# Patient Record
Sex: FEMALE | Race: WHITE | Employment: FULL TIME
[De-identification: names, ages, dates, MRNs, and addresses within clinical notes are randomized per-mention and may not be internally consistent; named-entity substitution may affect disease eponyms.]

---

## 2023-07-25 ENCOUNTER — HOSPITAL ENCOUNTER (OUTPATIENT)
Facility: HOSPITAL | Age: 58
Setting detail: RECURRING SERIES
Discharge: HOME | End: 2023-07-28
Payer: OTHER GOVERNMENT

## 2023-07-25 PROCEDURE — 97112 NEUROMUSCULAR REEDUCATION: CPT

## 2023-07-25 PROCEDURE — 97530 THERAPEUTIC ACTIVITIES: CPT

## 2023-07-25 PROCEDURE — 97110 THERAPEUTIC EXERCISES: CPT

## 2023-07-25 NOTE — PROGRESS NOTES
Total Total     TOTAL TREATMENT TIME:        40     [x]  Patient Education billed concurrently with other procedures   [x] Review HEP    [x] Progressed/Changed HEP, detail:  Access Code: [email or web address]  URL: [email or web address]  Date: 07/25/2023  Prepared by: Isela    Exercises  - Supine Posterior Pelvic Tilt  - 1 x daily - 7 x weekly - 2 sets - 10 reps - 5 secs hold  - Supine Bridge  - 1 x daily - 7 x weekly - 2 sets - 10 reps  - Supine Lower Trunk Rotation  - 1 x daily - 7 x weekly - 1 sets - 10 reps - 10 secs hold  - Supine Piriformis Stretch with Foot on Ground  - 1 x daily - 7 x weekly - 1 sets - 2 reps - 30 secs hold  - Sidelying Hip Abduction  - 1 x daily - 7 x weekly - 2 sets - 10 reps  [] Other detail:       Objective Information/Functional Measures/Assessment    Patient refused to try the Nustep or recumbent bike today, stating she has ongoing issues with her right knee that she states needs a knee replacement. Initiated exercises per POC today, which included stretches, core stabilization, and proximal LE strengthening. Albeit, patient was with limited willingness to try several strengthening exercises, but after patient education and encouragement by DPT patient did perform some LE strengthening exercises. She was noted to have weakness of bilateral LE's during flexion SLR's and hip abduction SLR's. HEP was given and reviewed with patient today. Of note, patient stated she is with financial limitations, so she'd like to only come once/week. Patient is also with limited availability for skilled PT with scheduling based on her personal/work schedule.     Patient will continue to benefit from skilled PT / OT services to modify and progress therapeutic interventions, analyze and address functional mobility deficits, analyze and address ROM deficits, analyze and address strength deficits, analyze and address soft tissue restrictions, analyze and cue for proper movement patterns, analyze and None

## 2023-11-07 ENCOUNTER — HOSPITAL ENCOUNTER (OUTPATIENT)
Facility: HOSPITAL | Age: 58
Setting detail: RECURRING SERIES
Discharge: HOME | End: 2023-11-10
Payer: OTHER GOVERNMENT

## 2023-11-07 PROCEDURE — 97535 SELF CARE MNGMENT TRAINING: CPT

## 2023-11-07 PROCEDURE — 97110 THERAPEUTIC EXERCISES: CPT

## 2023-11-07 PROCEDURE — 97162 PT EVAL MOD COMPLEX 30 MIN: CPT

## 2023-11-07 PROCEDURE — 97016 VASOPNEUMATIC DEVICE THERAPY: CPT

## 2023-11-07 NOTE — PROGRESS NOTES
time on right LE   Stairs:NT  Posture: Valgus right > left rashard  Palpation/Sensation: pain at right lateral knee joint line     ROM:                                        AROM     Knee Left Right   Extension 0 17   Flexion 129 108      Girth:  Knee Left Right   midline of patella 38 40       Vasopnuematic compression justification:  Per bilateral girth measures taken and listed above the edema is considered significant and having an impact on the patient's strength, balance, gait, transfers, self care, and ADLs       Strength (MMT):                                            Hip Left (1-5) Right (1-5)   Hip Flexion 4+ 3+ P! Hip ABD 3+ 3+     Knee Left (1-5) Right (1-5)   Knee Flexion 4- 4-   Knee Extension 5 4+   Ankle DF 5 4+   Other         Special Tests:    Knee  Left Right   Varus Stress Test - -   Valgus Stress Test - -   Lachman's - -   Radu's - +   Posterior Sag - -     Hip Left Right   Leander Test - Gillespie Vira + +   Piriformis Test + +       Other Tests / Comments:        Pain Level (0-10 scale) post treatment: 0/10    ASSESSMENT/Changes in Function: 64 yo female who [facility] with c/o rashard knee Pain right > left. Patient is s/p reported that when she was DC last time she was doing exercises at work and she felt her knee pulls. She also has pain with sitting in a long sit position in her recliner down her left leg. Pt then reports that 2 weeks ago she slide down the last 2 steps of her stairs landing on her feet jarring her entire body. Patient reports she has a significant PMH of right knee meniscal tear and right knee problems. Patient demonstrates decreased ROM, decreased strength, impaired posture, impaired gait mechancis, pain and decreased functional mobility tolerance.     Patient will continue to benefit from skilled PT services to modify and progress therapeutic interventions, address functional mobility deficits, address ROM deficits, address strength deficits, analyze and
PT 11/7/2023 6:18 PM  ____________________________________________________________________  I certify that the above Therapy Services are being furnished while the patient is under my care. I agree with the treatment plan and certify that this therapy is necessary.     Physician's Signature:____________________________Date:_________TIME:________                                      Bebeto Burks MD  Insurance: Payor:  EAST / Plan:  EAST / Product Type: *No Product type* /      ** Signature, Date and Time must be completed for valid certification **    [facility] [place] [address] Syliva Canavan, [address] [place] [address]  Ph [phone]  Fx [phone]

## 2023-11-09 ENCOUNTER — APPOINTMENT (OUTPATIENT)
Facility: HOSPITAL | Age: 58
End: 2023-11-09
Payer: OTHER GOVERNMENT

## 2023-11-09 ENCOUNTER — HOSPITAL ENCOUNTER (OUTPATIENT)
Facility: HOSPITAL | Age: 58
Setting detail: RECURRING SERIES
Discharge: HOME | End: 2023-11-12
Payer: OTHER GOVERNMENT

## 2023-11-09 PROCEDURE — 97112 NEUROMUSCULAR REEDUCATION: CPT

## 2023-11-09 PROCEDURE — 97110 THERAPEUTIC EXERCISES: CPT

## 2023-11-09 PROCEDURE — 97140 MANUAL THERAPY 1/> REGIONS: CPT

## 2023-11-09 PROCEDURE — 97016 VASOPNEUMATIC DEVICE THERAPY: CPT

## 2023-11-09 NOTE — PROGRESS NOTES
treatments:  Patient will improve FOTO score to 58 points  in order to maximize function and promote patient satisfaction with overall outcome. Eval Status: FOTO 43  FOTO score = an established functional score where 100 = no disability     2. Pt will be able to perform 30 sec sit to stand test with 15 reps per age related norms in order to improve transfers including toilet transfers. Eval Status: NT     3. Pt will have 5/5 rashard LE strength to return to goals of improved walking standing transfers and squating. Eval Status:   Hip Left (1-5) Right (1-5)   Hip Flexion 4+ 3+ P! Hip ABD 3+ 3+      Knee Left (1-5) Right (1-5)   Knee Flexion 4- 4-   Knee Extension 5 4+   Ankle DF 5 4+         4. Patient will improve pain in right knee to 1-2/10 at worst to improve standing and transfer tolerance and restore prior level of function.   Eval Status: 10/10 at worst      PLAN  Yes  Continue plan of care  []  Upgrade activities as tolerated  []  Discharge due to :  []  Other:    Luc Awad, PT    11/9/2023    4:32 PM    Future Appointments   Date Time Provider [address]   11/14/2023  3:50 PM Robert Hernandez, [facility]   11/20/2023  5:10 PM Daryle Castillo [facility]   11/22/2023  5:50 PM Daryle Castillo [facility]   11/27/2023  3:50 PM Daryle Castillo [facility]   11/29/2023  3:50 PM Daryle Castillo [facility]   12/4/2023  4:30 PM Daryle Castillo [facility]   12/11/2023  3:50 PM Daryle Castillo [facility]   12/13/2023  4:30 PM Daryle Castillo [facility]   12/18/2023  3:50 PM Daryle Castillo [facility]   12/20/2023  4:30 PM Daryle Castillo [facility]   12/27/2023  3:50 PM Daryle Espinoza [facility]

## 2023-11-14 ENCOUNTER — HOSPITAL ENCOUNTER (OUTPATIENT)
Facility: HOSPITAL | Age: 58
Setting detail: RECURRING SERIES
Discharge: HOME | End: 2023-11-17
Payer: OTHER GOVERNMENT

## 2023-11-14 PROCEDURE — 97110 THERAPEUTIC EXERCISES: CPT

## 2023-11-14 PROCEDURE — 97112 NEUROMUSCULAR REEDUCATION: CPT

## 2023-11-14 PROCEDURE — 97016 VASOPNEUMATIC DEVICE THERAPY: CPT

## 2023-11-14 NOTE — PROGRESS NOTES
bill using total billable min of TIMED therapeutic procedures (example: do not include dry needle or estim unattended, both untimed codes, in totals to left)  8-22 min = 1 unit; 23-37 min = 2 units; 38-52 min = 3 units; 53-67 min = 4 units; 68-82 min = 5 units   Total Total     TOTAL TREATMENT TIME:        52     [x]  Patient Education billed concurrently with other procedures   [x] Review HEP    [] Progressed/Changed HEP, detail:    [] Other detail:       Objective Information/Functional Measures/Assessment    Patient tolerated treatment session well today. Patient had no complaints with addition of nustep to exercise program to accomplish improved ROM. Pt moves slowly through program.  Patient continues to make steady progress toward goals and would benefit from continued skilled PT intervention to address remaining deficits outlined in goals below. Patient will continue to benefit from skilled PT / OT services to modify and progress therapeutic interventions, analyze and address functional mobility deficits, analyze and address ROM deficits, analyze and address strength deficits, analyze and address soft tissue restrictions, analyze and cue for proper movement patterns, and analyze and modify for postural abnormalities to address functional deficits and attain remaining goals. Progress toward goals / Updated goals:  []  See Progress Note/Recertification       Short Term Goals: To be accomplished in 8 treatments:  Patient will be independent and compliant with HEP to progress toward goals and restore functional mobility. Eval Status: issued at eval     Patient will improve pain in right knee to 5/10 at worst  to improve standing and sitting tolerance and restore prior level of function. Eval Status: 10/10 at worst     Pt will have 4/5 hip abduction strength to return to goals of improved knee alignment in order to improve standing tolerance and walking tolerance.   Eval Status:   Hip Left (1-5) Right

## 2023-11-15 ENCOUNTER — APPOINTMENT (OUTPATIENT)
Facility: HOSPITAL | Age: 58
End: 2023-11-15
Payer: OTHER GOVERNMENT

## 2023-11-20 ENCOUNTER — HOSPITAL ENCOUNTER (OUTPATIENT)
Facility: HOSPITAL | Age: 58
Setting detail: RECURRING SERIES
Discharge: HOME | End: 2023-11-23
Payer: OTHER GOVERNMENT

## 2023-11-20 ENCOUNTER — APPOINTMENT (OUTPATIENT)
Facility: HOSPITAL | Age: 58
End: 2023-11-20
Payer: OTHER GOVERNMENT

## 2023-11-20 PROCEDURE — 97535 SELF CARE MNGMENT TRAINING: CPT

## 2023-11-20 PROCEDURE — 97530 THERAPEUTIC ACTIVITIES: CPT

## 2023-11-20 PROCEDURE — 97016 VASOPNEUMATIC DEVICE THERAPY: CPT

## 2023-11-20 PROCEDURE — 97110 THERAPEUTIC EXERCISES: CPT

## 2023-11-20 NOTE — PROGRESS NOTES
PHYSICAL / OCCUPATIONAL THERAPY - DAILY TREATMENT NOTE (updated )    Patient Name: Ray Montiel    Date: 2023    : 1965  Insurance: Payor:  EAST / Plan: [employer] EAST / Product Type: *No Product type* /      Patient  verified Yes     Visit #   Current / Total 4 16   Time   In / Out 5:10 6:03   Pain   In / Out 3/10 Right, 2/10 Left 4/10 Right 210 Left   Subjective Functional Status/Changes: \"I have a good amount of pain in my Right knee compared to my Left knee. \"   Changes to: Allergies, Med Hx, Sx Hx?   no       TREATMENT AREA =  Pain in right knee [M25.561]    OBJECTIVE    Modalities Rationale:     decrease edema, decrease inflammation, and decrease pain to improve patient's ability to progress to PLOF and address remaining functional goals. min [] Estim Unattended, type/location:                                      []  w/ice    []  w/heat    min [] Estim Attended, type/location:                                     []  w/US     []  w/ice    []  w/heat    []  TENS insruct      min []  Mechanical Traction: type/lbs                   []  pro   []  sup   []  int   []  cont    []  before manual    []  after manual    min []  Ultrasound, settings/location:      min []  Iontophoresis w/ dexamethasone, location:                                               []  take home patch       []  in clinic        min  unbilled []  Ice     []  Heat    location/position:     min []  Paraffin,  details:    10 min [x]  Vasopneumatic Device, press/temp: Med/Low    min []  Narciso Pickens / [employer]ra [place]: If using vaso (only need to measure limb vaso being performed on)      pre-treatment girth : 43 cm      post-treatment girth : 42 cm      measured at (landmark location) :  Mid-patella    min []  Other:    Skin assessment post-treatment (if applicable):    [x]  intact    []  redness- no adverse reaction                 []redness - adverse reaction:         Therapeutic Procedures:   Tx Min Billable or 1:1 Min (if

## 2023-11-22 ENCOUNTER — HOSPITAL ENCOUNTER (OUTPATIENT)
Facility: HOSPITAL | Age: 58
Setting detail: RECURRING SERIES
Discharge: HOME | End: 2023-11-25
Payer: OTHER GOVERNMENT

## 2023-11-22 ENCOUNTER — APPOINTMENT (OUTPATIENT)
Facility: HOSPITAL | Age: 58
End: 2023-11-22
Payer: OTHER GOVERNMENT

## 2023-11-22 PROCEDURE — 97110 THERAPEUTIC EXERCISES: CPT

## 2023-11-22 PROCEDURE — 97016 VASOPNEUMATIC DEVICE THERAPY: CPT

## 2023-11-22 PROCEDURE — 97530 THERAPEUTIC ACTIVITIES: CPT

## 2023-11-22 PROCEDURE — 97535 SELF CARE MNGMENT TRAINING: CPT

## 2023-11-22 NOTE — PROGRESS NOTES
PHYSICAL / OCCUPATIONAL THERAPY - DAILY TREATMENT NOTE (updated )    Patient Name: Estefany Waldrop    Date: 2023    : 1965  Insurance: Payor:  EAST / Plan: [employer] EAST / Product Type: *No Product type* /      Patient  verified Yes     Visit #   Current / Total 5 16   Time   In / Out 4:10 4:55   Pain   In / Out 3/10 0/10   Subjective Functional Status/Changes: \"I have some pain in my Right Knee today. \"   Changes to: Allergies, Med Hx, Sx Hx?   no       TREATMENT AREA =  Pain in right knee [M25.561]    OBJECTIVE    Modalities Rationale:     decrease edema, decrease inflammation, and decrease pain to improve patient's ability to progress to PLOF and address remaining functional goals. min [] Estim Unattended, type/location:                                      []  w/ice    []  w/heat    min [] Estim Attended, type/location:                                     []  w/US     []  w/ice    []  w/heat    []  TENS insruct      min []  Mechanical Traction: type/lbs                   []  pro   []  sup   []  int   []  cont    []  before manual    []  after manual    min []  Ultrasound, settings/location:      min []  Iontophoresis w/ dexamethasone, location:                                               []  take home patch       []  in clinic        min  unbilled []  Ice     []  Heat    location/position:     min []  Paraffin,  details:    10 min [x]  Vasopneumatic Device, press/temp: Med/Low    min []  Elena Blue / [place]: If using vaso (only need to measure limb vaso being performed on)      pre-treatment girth : 44 cm      post-treatment girth : 43.5 cm      measured at (landmark location) :  Mid-Patella    min []  Other:    Skin assessment post-treatment (if applicable):    [x]  intact    []  redness- no adverse reaction                 []redness - adverse reaction:         Therapeutic Procedures:   Tx Min Billable or 1:1 Min (if diff from Tx Min) Procedure, Rationale, Specifics   14 [id number]

## 2023-11-27 ENCOUNTER — APPOINTMENT (OUTPATIENT)
Facility: HOSPITAL | Age: 58
End: 2023-11-27
Payer: OTHER GOVERNMENT

## 2023-11-27 ENCOUNTER — HOSPITAL ENCOUNTER (OUTPATIENT)
Facility: HOSPITAL | Age: 58
Setting detail: RECURRING SERIES
Discharge: HOME | End: 2023-11-30
Payer: OTHER GOVERNMENT

## 2023-11-27 PROCEDURE — 97110 THERAPEUTIC EXERCISES: CPT

## 2023-11-27 PROCEDURE — 97016 VASOPNEUMATIC DEVICE THERAPY: CPT

## 2023-11-27 PROCEDURE — 97112 NEUROMUSCULAR REEDUCATION: CPT

## 2023-11-29 ENCOUNTER — APPOINTMENT (OUTPATIENT)
Facility: HOSPITAL | Age: 58
End: 2023-11-29
Payer: OTHER GOVERNMENT

## 2023-11-29 ENCOUNTER — HOSPITAL ENCOUNTER (OUTPATIENT)
Facility: HOSPITAL | Age: 58
Setting detail: RECURRING SERIES
Discharge: HOME | End: 2023-12-02
Payer: OTHER GOVERNMENT

## 2023-11-29 PROCEDURE — 97110 THERAPEUTIC EXERCISES: CPT

## 2023-11-29 PROCEDURE — 97530 THERAPEUTIC ACTIVITIES: CPT

## 2023-11-29 PROCEDURE — 97016 VASOPNEUMATIC DEVICE THERAPY: CPT

## 2023-11-29 PROCEDURE — 97535 SELF CARE MNGMENT TRAINING: CPT

## 2023-11-29 NOTE — PROGRESS NOTES
PHYSICAL / OCCUPATIONAL THERAPY - DAILY TREATMENT NOTE (updated )    Patient Name: Dinora Haro    Date: 2023    : 1965  Insurance: Payor:  EAST / Plan: [employer] EAST / Product Type: *No Product type* /      Patient  verified Yes     Visit #   Current / Total 7 16   Time   In / Out 3:34 4:30   Pain   In / Out 2/10 3/10   Subjective Functional Status/Changes: \"I just have a little pain today. \"   Changes to: Allergies, Med Hx, Sx Hx?   no       TREATMENT AREA =  Pain in right knee [M25.561]    OBJECTIVE    Modalities Rationale:     decrease edema, decrease inflammation, and decrease pain to improve patient's ability to progress to PLOF and address remaining functional goals. min [] Estim Unattended, type/location:                                      []  w/ice    []  w/heat    min [] Estim Attended, type/location:                                     []  w/US     []  w/ice    []  w/heat    []  TENS insruct      min []  Mechanical Traction: type/lbs                   []  pro   []  sup   []  int   []  cont    []  before manual    []  after manual    min []  Ultrasound, settings/location:      min []  Iontophoresis w/ dexamethasone, location:                                               []  take home patch       []  in clinic        min  unbilled []  Ice     []  Heat    location/position:     min []  Paraffin,  details:    10 min [x]  Vasopneumatic Device, press/temp: Med/Low    min []  Jerry Cohn / Santosh Holland: If using vaso (only need to measure limb vaso being performed on)      pre-treatment girth : Left: 42 cm, Right: 42.5 cm      post-treatment girth : Left: 41.5 cm, Right: 41.5 cm      measured at (landmark location) :  Mid-Patella    min []  Other:    Skin assessment post-treatment (if applicable):    [x]  intact    []  redness- no adverse reaction                 []redness - adverse reaction:         Therapeutic Procedures:   Tx Min Billable or 1:1 Min (if diff from [employer])

## 2023-12-04 ENCOUNTER — APPOINTMENT (OUTPATIENT)
Facility: HOSPITAL | Age: 58
End: 2023-12-04
Payer: OTHER GOVERNMENT

## 2023-12-04 ENCOUNTER — HOSPITAL ENCOUNTER (OUTPATIENT)
Facility: HOSPITAL | Age: 58
Setting detail: RECURRING SERIES
End: 2023-12-04
Payer: OTHER GOVERNMENT

## 2023-12-04 ENCOUNTER — TELEPHONE (OUTPATIENT)
Facility: HOSPITAL | Age: 58
End: 2023-12-04

## 2023-12-06 ENCOUNTER — HOSPITAL ENCOUNTER (OUTPATIENT)
Facility: HOSPITAL | Age: 58
Setting detail: RECURRING SERIES
Discharge: HOME | End: 2023-12-09
Payer: OTHER GOVERNMENT

## 2023-12-06 PROCEDURE — 97535 SELF CARE MNGMENT TRAINING: CPT

## 2023-12-06 PROCEDURE — 97530 THERAPEUTIC ACTIVITIES: CPT

## 2023-12-06 PROCEDURE — 97110 THERAPEUTIC EXERCISES: CPT

## 2023-12-06 PROCEDURE — 97016 VASOPNEUMATIC DEVICE THERAPY: CPT

## 2023-12-06 NOTE — PROGRESS NOTES
[facility]  [address], [address] [facility]  Ph [phone]  Fx [phone]    Physical Therapy Progress Note  Patient name: Mani Quintero Start of Care: 2023   Referral source: Stephanie Coronel MD : 1965   Medical/Treatment Diagnosis: Pain in right knee [M25.561] Onset Date:2 weeks ago   Prior Hospitalization: see medical history Provider#: [id number]   Medications: Verified on Patient Summary List    Comorbidities: 2x early  falling onto both knees with the right knee collapsing waiting on surgery until pain is unbearable while, the left knee is less inflamed. Atrial Septal Defect repairs x2, half thyroid removed, LBP   Prior Level of Function: functionally independent, no AD, active lifestyle     Visits from Start of Care: 8    Missed Visits: 1    Updated Goals/Measure of Progress:     Short Term Goals: To be accomplished in 8 treatments:  Patient will be independent and compliant with HEP to progress toward goals and restore functional mobility. Eval Status: issued at eval  23 PN: pt reports mostly 1x/day compliance Progressing     Patient will improve pain in right knee to 5/10 at worst  to improve standing and sitting tolerance and restore prior level of function. Eval Status: 10/10 at worst  23 PN: 10/10 at worst in the past week No Change     Pt will have 4/5 hip abduction strength to return to goals of improved knee alignment in order to improve standing tolerance and walking tolerance. Eval Status:   Hip Left (1-5) Right (1-5) Left 23 Right 23   Hip Flexion 4+ 3+ P! 5 4-   Hip ABD 3+ 3+ 4- 4      Knee Left (1-5) Right (1-5) Left 23 Right 23   Knee Flexion 4- 4- 5 4-   Knee Extension 5 4+ 5 5   Ankle DF 5 4+ 5 5   23 PN: see above Progressing     Pt will have painfree right knee AROM WFL to aid in functional mechanics for ambulation/ADLs.   Eval Status:   Knee Left Right   Extension 0 17   Flexion 129 108
4:30 PM Mary Jane Rinne [facility]   12/27/2023  3:50 PM Mary Jane Rinne [facility]

## 2023-12-07 ENCOUNTER — APPOINTMENT (OUTPATIENT)
Facility: HOSPITAL | Age: 58
End: 2023-12-07
Payer: OTHER GOVERNMENT

## 2023-12-11 ENCOUNTER — APPOINTMENT (OUTPATIENT)
Facility: HOSPITAL | Age: 58
End: 2023-12-11
Payer: OTHER GOVERNMENT

## 2023-12-13 ENCOUNTER — APPOINTMENT (OUTPATIENT)
Facility: HOSPITAL | Age: 58
End: 2023-12-13
Payer: OTHER GOVERNMENT

## 2023-12-13 ENCOUNTER — HOSPITAL ENCOUNTER (OUTPATIENT)
Facility: HOSPITAL | Age: 58
Setting detail: RECURRING SERIES
Discharge: HOME | End: 2023-12-16
Payer: OTHER GOVERNMENT

## 2023-12-13 PROCEDURE — 97112 NEUROMUSCULAR REEDUCATION: CPT

## 2023-12-13 PROCEDURE — 97530 THERAPEUTIC ACTIVITIES: CPT

## 2023-12-13 PROCEDURE — 97535 SELF CARE MNGMENT TRAINING: CPT

## 2023-12-13 PROCEDURE — 97110 THERAPEUTIC EXERCISES: CPT

## 2023-12-13 NOTE — PROGRESS NOTES
PHYSICAL / OCCUPATIONAL THERAPY - DAILY TREATMENT NOTE (updated )    Patient Name: Merced Portal    Date: 2023    : 1965  Insurance: Payor:  EAST / Plan: [facility] / Product Type: *No Product type* /      Patient  verified Yes     Visit #   Current / Total 9 16   Time   In / Out 4:36 5:29   Pain   In / Out 3/10 1/10   Subjective Functional Status/Changes: \"I have some pain today but, not terrible. \"   Changes to: Allergies, Med Hx, Sx Hx?   no       TREATMENT AREA =  Pain in right knee [M25.561]    OBJECTIVE    Therapeutic Procedures: Tx Min Billable or 1:1 Min (if diff from Tx Min) Procedure, Rationale, Specifics   18  90592 Therapeutic Exercise (timed):  increase ROM, strength, coordination, balance, and proprioception to improve patient's ability to progress to PLOF and address remaining functional goals. (see flow sheet as applicable)    Details if applicable:       10  64197 Neuromuscular Re-Education (timed):  improve balance, coordination, kinesthetic sense, posture, core stability and proprioception to improve patient's ability to develop conscious control of individual muscles and awareness of position of extremities in order to progress to PLOF and address remaining functional goals. (see flow sheet as applicable)    Details if applicable:     15  81456 Therapeutic Activity (timed):  use of dynamic activities replicating functional movements to increase ROM, strength, coordination, balance, and proprioception in order to improve patient's ability to progress to PLOF and address remaining functional goals. (see flow sheet as applicable)     Details if applicable:     10  75667 Self Care/Home Management (timed):  improve patient knowledge and understanding of pain reducing techniques, positioning, and posture/ergonomics  to improve patient's ability to progress to PLOF and address remaining functional goals.   (see flow sheet as applicable)    Details if applicable:

## 2023-12-18 ENCOUNTER — APPOINTMENT (OUTPATIENT)
Facility: HOSPITAL | Age: 58
End: 2023-12-18
Payer: OTHER GOVERNMENT

## 2023-12-21 ENCOUNTER — APPOINTMENT (OUTPATIENT)
Facility: HOSPITAL | Age: 58
End: 2023-12-21
Payer: OTHER GOVERNMENT

## 2023-12-27 ENCOUNTER — APPOINTMENT (OUTPATIENT)
Facility: HOSPITAL | Age: 58
End: 2023-12-27
Payer: OTHER GOVERNMENT

## 2023-12-27 ENCOUNTER — HOSPITAL ENCOUNTER (OUTPATIENT)
Facility: HOSPITAL | Age: 58
Setting detail: RECURRING SERIES
Discharge: HOME | End: 2023-12-30
Payer: OTHER GOVERNMENT

## 2023-12-27 PROCEDURE — 97530 THERAPEUTIC ACTIVITIES: CPT

## 2023-12-27 PROCEDURE — 97110 THERAPEUTIC EXERCISES: CPT

## 2023-12-27 PROCEDURE — 97535 SELF CARE MNGMENT TRAINING: CPT

## 2023-12-27 NOTE — PROGRESS NOTES
PHYSICAL / OCCUPATIONAL THERAPY - DAILY TREATMENT NOTE (updated )    Patient Name: Cindy Vargas    Date: 2023    : 1965  Insurance: Payor:  EAST / Plan:  EAST / Product Type: *No Product type* /      Patient  verified Yes     Visit #   Current / Total 11 16   Time   In / Out 1:05 1:58   Pain   In / Out 0/10 010   Subjective Functional Status/Changes: \"I don't have any pain. I actually feel really good. \"   Changes to: Allergies, Med Hx, Sx Hx?   no       TREATMENT AREA =  Pain in right knee [M25.561]    OBJECTIVE    Therapeutic Procedures: Tx Min Billable or 1:1 Min (if diff from Tx Min) Procedure, Rationale, Specifics   10  22075 Therapeutic Exercise (timed):  increase ROM, strength, coordination, balance, and proprioception to improve patient's ability to progress to PLOF and address remaining functional goals. (see flow sheet as applicable)    Details if applicable:         53259 Neuromuscular Re-Education (timed):  improve balance, coordination, kinesthetic sense, posture, core stability and proprioception to improve patient's ability to develop conscious control of individual muscles and awareness of position of extremities in order to progress to PLOF and address remaining functional goals. (see flow sheet as applicable)    Details if applicable:     33  32252 Therapeutic Activity (timed):  use of dynamic activities replicating functional movements to increase ROM, strength, coordination, balance, and proprioception in order to improve patient's ability to progress to PLOF and address remaining functional goals. (see flow sheet as applicable)     Details if applicable:     10  04011 Self Care/Home Management (timed):  improve patient knowledge and understanding of pain reducing techniques, positioning, and posture/ergonomics  to improve patient's ability to progress to PLOF and address remaining functional goals.   (see flow sheet as applicable)    Details if applicable:

## 2023-12-28 NOTE — PROGRESS NOTES
[facility]  [address], [address] [facility]  Ph [phone]  Fx [phone]    Physical Therapy Discharge Summary    Patient name: Leighton Pulliam of Care: 2023   Referral source: Mago Whatley MD : 1965   Medical/Treatment Diagnosis: Pain in right knee [M25.561] Onset Date:2 weeks ago   Prior Hospitalization: see medical history Provider#: [id number]   Medications: Verified on Patient Summary List     Comorbidities: 2x early  falling onto both knees with the right knee collapsing waiting on surgery until pain is unbearable while, the left knee is less inflamed. Atrial Septal Defect repairs x2, half thyroid removed, LBP   Prior Level of Function: functionally independent, no AD, active lifestyle     Visits from Start of Care: 11    Missed Visits: 1    Reporting Period : 23 to 23    Goals/Measure of Progress:  Short Term Goals: To be accomplished in 8 treatments:  Patient will be independent and compliant with HEP to progress toward goals and restore functional mobility. Eval Status: issued at eval  23 PN: pt reports mostly 1x/day compliance Progressing  Current: Pt reports mostly daily compliance with HEP but independent with understanding and performance 23 MET     Patient will improve pain in right knee to 5/10 at worst  to improve standing and sitting tolerance and restore prior level of function. Eval Status: 10/10 at worst  23 PN: 10/10 at worst in the past week No Change  Current: 5/10 at worst in the past week 23 MET     Pt will have 4/5 hip abduction strength to return to goals of improved knee alignment in order to improve standing tolerance and walking tolerance.   Eval Status:   Hip Left (1-5) Right (1-5) Left 23 Right 23 Left 23 Right 23   Hip Flexion 4+ 3+ P! 5 4- 5 5   Hip ABD 3+ 3+ 4- 4 4 5      Knee Left (1-5) Right (1-5) Left 23 Right 23 Left 23 Right 23

## 2024-04-09 NOTE — PROGRESS NOTES
[facility] Call [facility]    Phone Message    May a detailed message be left on voicemail: yes     Reason for Call: Other: Patient called in is having some bad abdominal pain. Patient states she would like a callback and be advised on what to do or if she can be seen sooner she has in person she kept for may as well as video visit for 4/16/24. Writer checked all providers no other sooner openings please advise.     Action Taken: Message routed to:  [facility]    Travel Screening: Not Applicable                                                                   
analyze and address strength deficits, analyze and address soft tissue restrictions, analyze and cue for proper movement patterns, and analyze and modify for postural abnormalities to address functional deficits and attain remaining goals. Progress toward goals / Updated goals:  []  See Progress Note/Recertification    Short Term Goals: To be accomplished in 8 treatments:  Patient will be independent and compliant with HEP to progress toward goals and restore functional mobility. Eval Status: issued at eval  Current: pt reports mostly 1x/day compliance 11/20/23      Patient will improve pain in right knee to 5/10 at worst  to improve standing and sitting tolerance and restore prior level of function. Eval Status: 10/10 at worst  Current: 8/10 at worst in the past week 11/22/23     Pt will have 4/5 hip abduction strength to return to goals of improved knee alignment in order to improve standing tolerance and walking tolerance. Eval Status:   Hip Left (1-5) Right (1-5)   Hip Flexion 4+ 3+ P! Hip ABD 3+ 3+      Knee Left (1-5) Right (1-5)   Knee Flexion 4- 4-   Knee Extension 5 4+   Ankle DF 5 4+         Pt will have painfree right knee AROM WFL to aid in functional mechanics for ambulation/ADLs. Eval Status:   Knee Left Right   Extension 0 17   Flexion 129 108      Current:  11/27/2023 Progressing for right  Knee Left Right   Extension 0 12   Flexion 126 112        Long Term Goals: To be accomplished in 16 treatments:  Patient will improve FOTO score to 58 points  in order to maximize function and promote patient satisfaction with overall outcome. Eval Status: FOTO 43  FOTO score = an established functional score where 100 = no disability     2. Pt will be able to perform 30 sec sit to stand test with 15 reps per age related norms in order to improve transfers including toilet transfers. Eval Status: NT     3.    Pt will have 5/5 rashard LE strength to return to goals of improved walking standing transfers